# Patient Record
Sex: FEMALE | Race: WHITE
[De-identification: names, ages, dates, MRNs, and addresses within clinical notes are randomized per-mention and may not be internally consistent; named-entity substitution may affect disease eponyms.]

---

## 2018-02-24 ENCOUNTER — HOSPITAL ENCOUNTER (EMERGENCY)
Dept: HOSPITAL 17 - NEPC | Age: 28
Discharge: HOME | End: 2018-02-24
Payer: MEDICAID

## 2018-02-24 VITALS
RESPIRATION RATE: 14 BRPM | SYSTOLIC BLOOD PRESSURE: 150 MMHG | HEART RATE: 63 BPM | DIASTOLIC BLOOD PRESSURE: 77 MMHG | TEMPERATURE: 98.4 F | OXYGEN SATURATION: 100 %

## 2018-02-24 VITALS — HEIGHT: 61 IN | BODY MASS INDEX: 45.79 KG/M2 | WEIGHT: 242.51 LBS

## 2018-02-24 VITALS — OXYGEN SATURATION: 100 % | RESPIRATION RATE: 16 BRPM

## 2018-02-24 DIAGNOSIS — H53.8: ICD-10-CM

## 2018-02-24 DIAGNOSIS — R11.2: ICD-10-CM

## 2018-02-24 DIAGNOSIS — R42: Primary | ICD-10-CM

## 2018-02-24 LAB
ALBUMIN SERPL-MCNC: 3.8 GM/DL (ref 3.4–5)
ALP SERPL-CCNC: 118 U/L (ref 45–117)
ALT SERPL-CCNC: 28 U/L (ref 10–53)
AST SERPL-CCNC: 19 U/L (ref 15–37)
BASOPHILS # BLD AUTO: 0.1 TH/MM3 (ref 0–0.2)
BASOPHILS NFR BLD: 0.9 % (ref 0–2)
BILIRUB SERPL-MCNC: 0.2 MG/DL (ref 0.2–1)
BUN SERPL-MCNC: 14 MG/DL (ref 7–18)
CALCIUM SERPL-MCNC: 9 MG/DL (ref 8.5–10.1)
CHLORIDE SERPL-SCNC: 107 MEQ/L (ref 98–107)
COLOR UR: (no result)
CREAT SERPL-MCNC: 1.04 MG/DL (ref 0.5–1)
EOSINOPHIL # BLD: 0.3 TH/MM3 (ref 0–0.4)
EOSINOPHIL NFR BLD: 3.4 % (ref 0–4)
ERYTHROCYTE [DISTWIDTH] IN BLOOD BY AUTOMATED COUNT: 14.8 % (ref 11.6–17.2)
GFR SERPLBLD BASED ON 1.73 SQ M-ARVRAT: 64 ML/MIN (ref 89–?)
GLUCOSE SERPL-MCNC: 72 MG/DL (ref 74–106)
GLUCOSE UR STRIP-MCNC: (no result) MG/DL
HCO3 BLD-SCNC: 26.5 MEQ/L (ref 21–32)
HCT VFR BLD CALC: 38.8 % (ref 35–46)
HGB BLD-MCNC: 12.9 GM/DL (ref 11.6–15.3)
HGB UR QL STRIP: (no result)
INR PPP: 1 RATIO
KETONES UR STRIP-MCNC: (no result) MG/DL
LYMPHOCYTES # BLD AUTO: 3.6 TH/MM3 (ref 1–4.8)
LYMPHOCYTES NFR BLD AUTO: 37.6 % (ref 9–44)
MCH RBC QN AUTO: 26.7 PG (ref 27–34)
MCHC RBC AUTO-ENTMCNC: 33.2 % (ref 32–36)
MCV RBC AUTO: 80.4 FL (ref 80–100)
MONOCYTE #: 0.5 TH/MM3 (ref 0–0.9)
MONOCYTES NFR BLD: 5.7 % (ref 0–8)
MUCOUS THREADS #/AREA URNS LPF: (no result) /LPF
NEUTROPHILS # BLD AUTO: 5.1 TH/MM3 (ref 1.8–7.7)
NEUTROPHILS NFR BLD AUTO: 52.4 % (ref 16–70)
NITRITE UR QL STRIP: (no result)
PLATELET # BLD: 412 TH/MM3 (ref 150–450)
PMV BLD AUTO: 7.4 FL (ref 7–11)
PROT SERPL-MCNC: 7.8 GM/DL (ref 6.4–8.2)
PROTHROMBIN TIME: 10.4 SEC (ref 9.8–11.6)
RBC # BLD AUTO: 4.82 MIL/MM3 (ref 4–5.3)
SODIUM SERPL-SCNC: 140 MEQ/L (ref 136–145)
SP GR UR STRIP: 1.01 (ref 1–1.03)
SQUAMOUS #/AREA URNS HPF: 4 /HPF (ref 0–5)
URINE LEUKOCYTE ESTERASE: (no result)
WBC # BLD AUTO: 9.7 TH/MM3 (ref 4–11)

## 2018-02-24 PROCEDURE — 80053 COMPREHEN METABOLIC PANEL: CPT

## 2018-02-24 PROCEDURE — 99285 EMERGENCY DEPT VISIT HI MDM: CPT

## 2018-02-24 PROCEDURE — 84703 CHORIONIC GONADOTROPIN ASSAY: CPT

## 2018-02-24 PROCEDURE — 84484 ASSAY OF TROPONIN QUANT: CPT

## 2018-02-24 PROCEDURE — 85610 PROTHROMBIN TIME: CPT

## 2018-02-24 PROCEDURE — 96360 HYDRATION IV INFUSION INIT: CPT

## 2018-02-24 PROCEDURE — 84443 ASSAY THYROID STIM HORMONE: CPT

## 2018-02-24 PROCEDURE — 85025 COMPLETE CBC W/AUTO DIFF WBC: CPT

## 2018-02-24 PROCEDURE — 85730 THROMBOPLASTIN TIME PARTIAL: CPT

## 2018-02-24 PROCEDURE — 81001 URINALYSIS AUTO W/SCOPE: CPT

## 2018-02-24 PROCEDURE — 70450 CT HEAD/BRAIN W/O DYE: CPT

## 2018-02-24 NOTE — RADRPT
EXAM DATE/TIME:  02/24/2018 19:47 

 

[facility] COMPARISON:     

No previous studies available for comparison.

 

 

INDICATIONS :     

Light headed and dizzy with nausea and vomiting.

                      

 

RADIATION DOSE:     

37.62 CTDIvol (mGy) 

 

 

 

MEDICAL HISTORY :     

None  

 

SURGICAL HISTORY :      

Cholecystectomy. 

 

ENCOUNTER:      

Initial

 

ACUITY:      

1 day

 

PAIN SCALE:      

3/10

 

LOCATION:        

cranial 

 

TECHNIQUE:     

Multiple contiguous axial images were obtained of the head.  Using automated exposure control and adj
ustment of the mA and/or kV according to patient size, radiation dose was kept as low as reasonably a
chievable to obtain optimal diagnostic quality images.   DICOM format image data is available electro
nically for review and comparison.  

 

FINDINGS:     

 

CEREBRUM:     

The ventricles are normal for age.  No evidence of midline shift, mass lesion, hemorrhage or acute in
farction.  No extra-axial fluid collections are seen.

 

POSTERIOR FOSSA:     

The cerebellum and brainstem are intact.  The 4th ventricle is midline.  The cerebellopontine angle i
s unremarkable.

 

EXTRACRANIAL:     

The visualized portion of the orbits is intact.

 

SKULL:     

The calvaria is intact.  No evidence of skull fracture.

 

CONCLUSION:     

No acute intracranial abnormality is identified.

 

 

 

 Reynaldo Malik MD on February 24, 2018 at 20:11           

Board Certified Radiologist.

 This report was verified electronically.

## 2018-02-24 NOTE — PD
HPI


Chief Complaint:  Syncope/Near-Syncope


Time Seen by Provider:  17:51


Travel History


International Travel<30 days:  No


Contact w/Intl Traveler<30days:  No


Traveled to known affect area:  No





History of Present Illness


HPI


27-year-old female presents to the ED for evaluation of 3 day history of 

dizziness.  Patient states that today she began experiencing worsening symptoms

, including nausea and a single episode of vomiting.  Symptoms accompanied by 

blurred vision, worsened when changing focal depth.  No alleviating or 

exacerbating factors reported.  She denies headache, cold or flu symptoms, ear 

pain, sinus congestion, chest pain, palpitations, shortness of breath, 

abdominal pain, nausea, vomiting, dysuria, weakness in the extremities.  She 

denies previous medical history.  She is not currently taking any medications.  

Denies illicit drug use.  Never had any symptoms like this before.  LMP 2/16/ 18.  She had her eyes checked 2 years ago.  Last blood work done about a year 

ago.





PFSH


Past Medical History


Influenza Vaccination:  No


Pregnant?:  Not Pregnant





Past Surgical History


Cholecystectomy:  Yes





Social History


Alcohol Use:  Yes (social)


Tobacco Use:  No


Substance Use:  No





Allergies-Medications


(Allergen,Severity, Reaction):  


Coded Allergies:  


     No Known Allergies (Unverified , 2/24/18)


Reported Meds & Prescriptions





Reported Meds & Active Scripts


Active


Zofran Odt (Ondansetron Odt) 4 Mg Tab 4 Mg SL Q8HR PRN








Review of Systems


Except as stated in HPI:  all other systems reviewed are Neg





Physical Exam


Narrative


GENERAL: Obese  female in no acute distress.


SKIN: Focused skin assessment warm/dry.


HEAD: Normocephalic.  Atraumatic.


EYES: No scleral icterus. No injection or drainage.  PERRLA.  EOMI.


ENT: Pearly gray tympanic membranes bilaterally. Oropharynx without erythema, 

edema, exudate.


NECK: Supple, trachea midline. No JVD or lymphadenopathy.


CARDIOVASCULAR: Regular rate and rhythm without murmurs, gallops, or rubs. 


RESPIRATORY: Breath sounds clear and equal bilaterally. No accessory muscle use.


GASTROINTESTINAL: Abdomen soft, non-tender, nondistended.  Active bowel sounds.


MUSCULOSKELETAL: No cyanosis, or edema.  Moves the extremities spontaneously.


NEUROLOGICAL: Awake and alert. Cranial nerves II through XII intact.  Motor and 

sensory grossly within normal limits. Five out of 5 muscle strength in all 

muscle groups.  Normal speech.


BACK: Nontender without obvious deformity. No CVA tenderness.





Data


Data


Last Documented VS





Vital Signs








  Date Time  Temp Pulse Resp B/P (MAP) Pulse Ox O2 Delivery O2 Flow Rate FiO2


 


2/24/18 19:02   16  100 Room Air  


 


2/24/18 17:28 98.4 63      








Orders





 Orders


Ed Urine Pregnancytest Poc (2/24/18 18:10)


Complete Blood Count With Diff (2/24/18 18:10)


Comprehensive Metabolic Panel (2/24/18 18:10)


Act Partial Throm Time (Ptt) (2/24/18 18:10)


Prothrombin Time / Inr (Pt) (2/24/18 18:10)


Urinalysis - C+S If Indicated (2/24/18 18:10)


Ecg Monitoring (2/24/18 18:10)


Iv Access Insert/Monitor (2/24/18 18:10)


Oximetry (2/24/18 18:10)


Sodium Chloride 0.9% Flush (Ns Flush) (2/24/18 18:15)


Electrocardiogram (2/24/18 18:19)


Troponin I (2/24/18 18:19)


Ct Brain W/O Iv Contrast(Rout) (2/24/18 18:19)


Blood Glucose (2/24/18 18:19)


Sodium Chlor 0.9% 1000 Ml Inj (Ns 1000 M (2/24/18 18:30)


Thyroid Stimulating Hormone (2/24/18 18:37)


Ed Discharge Order (2/24/18 20:29)





Labs





Laboratory Tests








Test


  2/24/18


18:25 2/24/18


18:45


 


White Blood Count 9.7 TH/MM3  


 


Red Blood Count 4.82 MIL/MM3  


 


Hemoglobin 12.9 GM/DL  


 


Hematocrit 38.8 %  


 


Mean Corpuscular Volume 80.4 FL  


 


Mean Corpuscular Hemoglobin 26.7 PG  


 


Mean Corpuscular Hemoglobin


Concent 33.2 % 


  


 


 


Red Cell Distribution Width 14.8 %  


 


Platelet Count 412 TH/MM3  


 


Mean Platelet Volume 7.4 FL  


 


Neutrophils (%) (Auto) 52.4 %  


 


Lymphocytes (%) (Auto) 37.6 %  


 


Monocytes (%) (Auto) 5.7 %  


 


Eosinophils (%) (Auto) 3.4 %  


 


Basophils (%) (Auto) 0.9 %  


 


Neutrophils # (Auto) 5.1 TH/MM3  


 


Lymphocytes # (Auto) 3.6 TH/MM3  


 


Monocytes # (Auto) 0.5 TH/MM3  


 


Eosinophils # (Auto) 0.3 TH/MM3  


 


Basophils # (Auto) 0.1 TH/MM3  


 


CBC Comment DIFF FINAL  


 


Differential Comment   


 


Prothrombin Time 10.4 SEC  


 


Prothromb Time International


Ratio 1.0 RATIO 


  


 


 


Activated Partial


Thromboplast Time 26.0 SEC 


  


 


 


Blood Urea Nitrogen 14 MG/DL  


 


Creatinine 1.04 MG/DL  


 


Random Glucose 72 MG/DL  


 


Total Protein 7.8 GM/DL  


 


Albumin 3.8 GM/DL  


 


Calcium Level 9.0 MG/DL  


 


Alkaline Phosphatase 118 U/L  


 


Aspartate Amino Transf


(AST/SGOT) 19 U/L 


  


 


 


Alanine Aminotransferase


(ALT/SGPT) 28 U/L 


  


 


 


Total Bilirubin 0.2 MG/DL  


 


Sodium Level 140 MEQ/L  


 


Potassium Level 3.9 MEQ/L  


 


Chloride Level 107 MEQ/L  


 


Carbon Dioxide Level 26.5 MEQ/L  


 


Anion Gap 7 MEQ/L  


 


Estimat Glomerular Filtration


Rate 64 ML/MIN 


  


 


 


Troponin I


  LESS THAN 0.02


NG/ML 


 


 


Thyroid Stimulating Hormone


3rd Gen 0.601 uIU/ML 


  


 


 


Urine Color  LIGHT-YELLOW 


 


Urine Turbidity  CLEAR 


 


Urine pH  5.5 


 


Urine Specific Gravity  1.011 


 


Urine Protein  NEG mg/dL 


 


Urine Glucose (UA)  NEG mg/dL 


 


Urine Ketones  NEG mg/dL 


 


Urine Occult Blood  TRACE 


 


Urine Nitrite  NEG 


 


Urine Bilirubin  NEG 


 


Urine Urobilinogen


  


  LESS THAN 2.0


MG/DL


 


Urine Leukocyte Esterase  SMALL 


 


Urine RBC  2 /hpf 


 


Urine WBC  3 /hpf 


 


Urine Squamous Epithelial


Cells 


  4 /hpf 


 


 


Urine Mucus  FEW /lpf 


 


Microscopic Urinalysis Comment


  


  CULT NOT


INDICATED











MDM


Medical Decision Making


Medical Screen Exam Complete:  Yes


Emergency Medical Condition:  Yes


Differential Diagnosis


dehydration versus metabolic derangement versus anemia versus less likely ICH 

versus other


Narrative Course


27-year-old female presents to the ED for evaluation of 3 day history of 

dizziness.  Patient states that today she began experiencing worsening symptoms

, including nausea and a single episode of vomiting this afternoon.  Symptoms 

accompanied by blurred vision, worsened when changing focal depth. Denies 

illicit drug use.  Never had any symptoms like this before.  LMP 2/16/18.  She 

had her eyes checked 2 years ago.  Vitals reviewed.  On exam there are no focal 

neuro deficits.  ENT exam is unremarkable.  No appreciable M/R/G.  Chest CTA B.

  Abdomen soft and nontender.  IV was established.  Patient was administered 1 

L normal saline.





CT brain: No acute cranial abnormality is identified per radiology read.


Bedside urine pregnancy test negative.


UA: No culture indicated.


TSH: 0.601


2/24/18 18:25








Total Protein 7.8, Albumin 3.8, Calcium Level 9.0, Alkaline Phosphatase 118 H, 

Aspartate Amino Transf (AST/SGOT) 19, Alanine Aminotransferase (ALT/SGPT) 28, 

Total Bilirubin 0.2





On recheck the patient states that she still having difficulties with vision, 

especially when changing focal depth.  I discussed the results of the workup 

with the patient.  Prescribed a few doses of Zofran, to be taken if nausea 

returns.  I recommended that she follow-up with her ophthalmologist for further 

evaluation, return to the ED for worsening symptoms.  The patient is agreeable 

with this plan.  She indicated understanding of the instructions.  She is 

stable and discharged home.





Diagnosis





 Primary Impression:  


 Dizziness, nonspecific


 Additional Impression:  


 Nausea & vomiting


 Qualified Codes:  R11.2 - Nausea with vomiting, unspecified


Referrals:  


Primary Care Physician


Patient Instructions:  Dizziness (ED), General Instructions





***Additional Instructions:  


Rest, hydrate.


Return to normal, gentle activities as tolerated.


Under the tongue Zofran as needed for continued nausea and vomiting.


Follow-up with your primary care provider and ophthalmologist.


Return to the ED for any urgent or emergent medical condition.


***Med/Other Pt SpecificInfo:  Prescription(s) given


Scripts


Ondansetron Odt (Zofran Odt) 4 Mg Tab


4 MG SL Q8HR Y for Nausea/Vomiting, #5 TAB 0 Refills


   Prov: Darcie Talbot MD         2/24/18


Disposition:  01 DISCHARGE HOME


Condition:  Stable











Estefani Merlos Feb 24, 2018 19:36

## 2018-03-09 ENCOUNTER — HOSPITAL ENCOUNTER (EMERGENCY)
Dept: HOSPITAL 17 - NEPC | Age: 28
Discharge: HOME | End: 2018-03-09
Payer: MEDICAID

## 2018-03-09 VITALS — HEIGHT: 61 IN | WEIGHT: 220.46 LBS | BODY MASS INDEX: 41.62 KG/M2

## 2018-03-09 VITALS
OXYGEN SATURATION: 99 % | SYSTOLIC BLOOD PRESSURE: 130 MMHG | DIASTOLIC BLOOD PRESSURE: 70 MMHG | HEART RATE: 68 BPM | TEMPERATURE: 97.7 F | RESPIRATION RATE: 16 BRPM

## 2018-03-09 DIAGNOSIS — R11.2: Primary | ICD-10-CM

## 2018-03-09 LAB
ALBUMIN SERPL-MCNC: 3.7 GM/DL (ref 3.4–5)
ALP SERPL-CCNC: 125 U/L (ref 45–117)
ALT SERPL-CCNC: 26 U/L (ref 10–53)
AST SERPL-CCNC: 12 U/L (ref 15–37)
BACTERIA #/AREA URNS HPF: (no result) /HPF
BASOPHILS # BLD AUTO: 0.1 TH/MM3 (ref 0–0.2)
BASOPHILS NFR BLD: 0.9 % (ref 0–2)
BILIRUB SERPL-MCNC: 0.3 MG/DL (ref 0.2–1)
BUN SERPL-MCNC: 12 MG/DL (ref 7–18)
CALCIUM SERPL-MCNC: 8.4 MG/DL (ref 8.5–10.1)
CHLORIDE SERPL-SCNC: 109 MEQ/L (ref 98–107)
COLOR UR: YELLOW
CREAT SERPL-MCNC: 0.74 MG/DL (ref 0.5–1)
EOSINOPHIL # BLD: 0.3 TH/MM3 (ref 0–0.4)
EOSINOPHIL NFR BLD: 4.1 % (ref 0–4)
ERYTHROCYTE [DISTWIDTH] IN BLOOD BY AUTOMATED COUNT: 14.5 % (ref 11.6–17.2)
GFR SERPLBLD BASED ON 1.73 SQ M-ARVRAT: 94 ML/MIN (ref 89–?)
GLUCOSE SERPL-MCNC: 96 MG/DL (ref 74–106)
GLUCOSE UR STRIP-MCNC: (no result) MG/DL
HCO3 BLD-SCNC: 23.1 MEQ/L (ref 21–32)
HCT VFR BLD CALC: 39.8 % (ref 35–46)
HGB BLD-MCNC: 13.5 GM/DL (ref 11.6–15.3)
HGB UR QL STRIP: (no result)
KETONES UR STRIP-MCNC: (no result) MG/DL
LYMPHOCYTES # BLD AUTO: 3.4 TH/MM3 (ref 1–4.8)
LYMPHOCYTES NFR BLD AUTO: 39.7 % (ref 9–44)
MCH RBC QN AUTO: 27.3 PG (ref 27–34)
MCHC RBC AUTO-ENTMCNC: 34 % (ref 32–36)
MCV RBC AUTO: 80.3 FL (ref 80–100)
MONOCYTE #: 0.5 TH/MM3 (ref 0–0.9)
MONOCYTES NFR BLD: 5.9 % (ref 0–8)
MUCOUS THREADS #/AREA URNS LPF: (no result) /LPF
NEUTROPHILS # BLD AUTO: 4.2 TH/MM3 (ref 1.8–7.7)
NEUTROPHILS NFR BLD AUTO: 49.4 % (ref 16–70)
NITRITE UR QL STRIP: (no result)
PLATELET # BLD: 364 TH/MM3 (ref 150–450)
PMV BLD AUTO: 7.4 FL (ref 7–11)
PROT SERPL-MCNC: 8 GM/DL (ref 6.4–8.2)
RBC # BLD AUTO: 4.96 MIL/MM3 (ref 4–5.3)
SODIUM SERPL-SCNC: 141 MEQ/L (ref 136–145)
SP GR UR STRIP: 1.02 (ref 1–1.03)
SQUAMOUS #/AREA URNS HPF: 10 /HPF (ref 0–5)
URINE LEUKOCYTE ESTERASE: (no result)
WBC # BLD AUTO: 8.5 TH/MM3 (ref 4–11)

## 2018-03-09 PROCEDURE — 81001 URINALYSIS AUTO W/SCOPE: CPT

## 2018-03-09 PROCEDURE — 99283 EMERGENCY DEPT VISIT LOW MDM: CPT

## 2018-03-09 PROCEDURE — 83690 ASSAY OF LIPASE: CPT

## 2018-03-09 PROCEDURE — 84703 CHORIONIC GONADOTROPIN ASSAY: CPT

## 2018-03-09 PROCEDURE — 80053 COMPREHEN METABOLIC PANEL: CPT

## 2018-03-09 PROCEDURE — 85025 COMPLETE CBC W/AUTO DIFF WBC: CPT

## 2018-03-09 NOTE — PD
HPI


Chief Complaint:  GI Complaint


Time Seen by Provider:  09:23


Travel History


International Travel<30 days:  No


Contact w/Intl Traveler<30days:  No


Traveled to known affect area:  No





History of Present Illness


HPI


27-year-old female states that she started having nonbloody emesis at 2 AM this 

morning.  She states she feels like she needs to have a bowel movement but 

denies specific diarrhea.  She denies any abdominal pain, fever or other 

concurrent complaints.  She states no specific modifying factors.  Quality is 

nonbloody.  Severity is couple episodes.  Duration is couple of hours.





PFSH


Past Medical History


Medical History:  Denies Significant Hx


Tetanus Vaccination:  Unknown


Influenza Vaccination:  No


Pregnant?:  Not Pregnant


LMP:  18





Past Surgical History


 Section:  Yes


Cholecystectomy:  Yes





Social History


Alcohol Use:  Yes (social)


Tobacco Use:  No


Substance Use:  No





Allergies-Medications


(Allergen,Severity, Reaction):  


Coded Allergies:  


     No Known Allergies (Unverified , 3/9/18)


Reported Meds & Prescriptions





Reported Meds & Active Scripts


Active


Zofran Odt (Ondansetron Odt) 4 Mg Tab 4 Mg SL Q6HR PRN








Review of Systems


Except as stated in HPI:  all other systems reviewed are Neg





Physical Exam


Narrative


GENERAL: 27-year-old female who is well-appearing


SKIN: Focused skin assessment warm/dry.


HEAD: Atraumatic. Normocephalic. 


EYES: Pupils equal and round. No scleral icterus. No injection or drainage. 


ENT: No nasal bleeding or discharge.  Mucous membranes pink and moist.


NECK: Trachea midline. No JVD. 


CARDIOVASCULAR: Regular rate and rhythm.  No murmur appreciated.


RESPIRATORY: No accessory muscle use. Clear to auscultation. Breath sounds 

equal bilaterally. 


GASTROINTESTINAL: Abdomen soft, non-tender, nondistended.


MUSCULOSKELETAL: No obvious deformities. No clubbing.  No cyanosis.  No edema. 


NEUROLOGICAL: Awake and alert. No obvious cranial nerve deficits.  Motor 

grossly within normal limits. Normal speech.


PSYCHIATRIC: Appropriate mood and affect; insight and judgment normal.





Data


Data


Last Documented VS





Vital Signs








  Date Time  Temp Pulse Resp B/P (MAP) Pulse Ox O2 Delivery O2 Flow Rate FiO2


 


3/9/18 08:10 97.7 68 16 130/70 (90) 99   








Orders





 Orders


Complete Blood Count With Diff (3/9/18 08:12)


Comprehensive Metabolic Panel (3/9/18 08:12)


Urinalysis - C+S If Indicated (3/9/18 08:12)


Iv Access Insert/Monitor (3/9/18 08:12)


Oximetry (3/9/18 08:12)


Lipase (3/9/18 08:12)


Ed Urine Pregnancytest Poc (3/9/18 09:23)


Ondansetron  Odt (Zofran  Odt) (3/9/18 09:45)


Oral Rehydration (3/9/18 09:32)


Ed Discharge Order (3/9/18 10:52)





Labs





Laboratory Tests








Test


  3/9/18


08:17


 


White Blood Count 8.5 TH/MM3 


 


Red Blood Count 4.96 MIL/MM3 


 


Hemoglobin 13.5 GM/DL 


 


Hematocrit 39.8 % 


 


Mean Corpuscular Volume 80.3 FL 


 


Mean Corpuscular Hemoglobin 27.3 PG 


 


Mean Corpuscular Hemoglobin


Concent 34.0 % 


 


 


Red Cell Distribution Width 14.5 % 


 


Platelet Count 364 TH/MM3 


 


Mean Platelet Volume 7.4 FL 


 


Neutrophils (%) (Auto) 49.4 % 


 


Lymphocytes (%) (Auto) 39.7 % 


 


Monocytes (%) (Auto) 5.9 % 


 


Eosinophils (%) (Auto) 4.1 % 


 


Basophils (%) (Auto) 0.9 % 


 


Neutrophils # (Auto) 4.2 TH/MM3 


 


Lymphocytes # (Auto) 3.4 TH/MM3 


 


Monocytes # (Auto) 0.5 TH/MM3 


 


Eosinophils # (Auto) 0.3 TH/MM3 


 


Basophils # (Auto) 0.1 TH/MM3 


 


CBC Comment DIFF FINAL 


 


Differential Comment  


 


Urine Color YELLOW 


 


Urine Turbidity HAZY 


 


Urine pH 5.5 


 


Urine Specific Gravity 1.023 


 


Urine Protein TRACE mg/dL 


 


Urine Glucose (UA) NEG mg/dL 


 


Urine Ketones NEG mg/dL 


 


Urine Occult Blood TRACE 


 


Urine Nitrite NEG 


 


Urine Bilirubin NEG 


 


Urine Urobilinogen


  LESS THAN 2.0


MG/DL


 


Urine Leukocyte Esterase LARGE 


 


Urine RBC 2 /hpf 


 


Urine WBC 3 /hpf 


 


Urine Squamous Epithelial


Cells 10 /hpf 


 


 


Urine Bacteria FEW /hpf 


 


Urine Mucus FEW /lpf 


 


Microscopic Urinalysis Comment


  CULT NOT


INDICATED


 


Blood Urea Nitrogen 12 MG/DL 


 


Creatinine 0.74 MG/DL 


 


Random Glucose 96 MG/DL 


 


Total Protein 8.0 GM/DL 


 


Albumin 3.7 GM/DL 


 


Calcium Level 8.4 MG/DL 


 


Alkaline Phosphatase 125 U/L 


 


Aspartate Amino Transf


(AST/SGOT) 12 U/L 


 


 


Alanine Aminotransferase


(ALT/SGPT) 26 U/L 


 


 


Total Bilirubin 0.3 MG/DL 


 


Sodium Level 141 MEQ/L 


 


Potassium Level 3.9 MEQ/L 


 


Chloride Level 109 MEQ/L 


 


Carbon Dioxide Level 23.1 MEQ/L 


 


Anion Gap 9 MEQ/L 


 


Estimat Glomerular Filtration


Rate 94 ML/MIN 


 


 


Lipase 128 U/L 











MDM


Medical Decision Making


Medical Screen Exam Complete:  Yes


Emergency Medical Condition:  Yes


Medical Record Reviewed:  Yes (Past history confirmed)


Interpretation(s)


CBC & BMP Diagram


3/9/18 08:17








Total Protein 8.0, Albumin 3.7, Calcium Level 8.4 L, Alkaline Phosphatase 125 H

, Aspartate Amino Transf (AST/SGOT) 12 L, Alanine Aminotransferase (ALT/SGPT) 26

, Total Bilirubin 0.3


Differential Diagnosis


Pregnancy, gastroenteritis, pancreatitis, dehydration


Narrative Course


Lab work from triage without emergent process.  Pregnancy test is negative.  We 

will dose with Zofran by mouth and orally hydrate.  Patient agrees if can 

tolerate this to go home with prescription for this.  Patient without any other 

concurrent symptoms at this time.





no emesis here, tolerating po challenge, Patient denies any new complaints and 

states that they are feeling better.  Patient happy with care, all questions 

answered. Patient knows that follow up is incumbent on them and to return to 

the emergency room immediately if new or worsening symptoms develop. Patient 

given strict return precautions, vitals reviewed and are normal, agrees to 

further workup as an outpatient.





Diagnosis





 Primary Impression:  


 Vomiting


 Qualified Codes:  R11.2 - Nausea with vomiting, unspecified


Patient Instructions:  General Instructions





***Additional Instructions:  


return as needed, follow with primary this week, zofran as needed


***Med/Other Pt SpecificInfo:  Prescription(s) given


Scripts


Ondansetron Odt (Zofran Odt) 4 Mg Tab


4 MG SL Q6HR Y for Nausea/Vomiting, #10 TAB 0 Refills


   Prov: Gabbie Box MD         3/9/18


Disposition:  01 DISCHARGE HOME


Condition:  Stable











Gabbie Box MD Mar 9, 2018 09:39